# Patient Record
Sex: FEMALE
[De-identification: names, ages, dates, MRNs, and addresses within clinical notes are randomized per-mention and may not be internally consistent; named-entity substitution may affect disease eponyms.]

---

## 2017-01-25 ENCOUNTER — APPOINTMENT (OUTPATIENT)
Dept: ORTHOPEDIC SURGERY | Facility: CLINIC | Age: 82
End: 2017-01-25

## 2017-01-25 VITALS
HEART RATE: 58 BPM | BODY MASS INDEX: 28.12 KG/M2 | DIASTOLIC BLOOD PRESSURE: 82 MMHG | SYSTOLIC BLOOD PRESSURE: 156 MMHG | HEIGHT: 66 IN | WEIGHT: 175 LBS

## 2017-01-25 DIAGNOSIS — M19.90 UNSPECIFIED OSTEOARTHRITIS, UNSPECIFIED SITE: ICD-10-CM

## 2017-01-25 DIAGNOSIS — M75.40 IMPINGEMENT SYNDROME OF UNSPECIFIED SHOULDER: ICD-10-CM

## 2017-01-26 RX ORDER — HYALURONATE SOD, CROSS-LINKED 30 MG/3 ML
30 SYRINGE (ML) INTRAARTICULAR
Refills: 0 | Status: COMPLETED | OUTPATIENT
Start: 2017-01-26

## 2017-01-26 RX ADMIN — Medication 0 MG/3ML: at 00:00

## 2017-01-28 PROBLEM — M19.90 ARTHRITIS OF KNEE, RIGHT: Status: ACTIVE | Noted: 2017-01-28

## 2017-01-28 PROBLEM — M75.40 IMPINGEMENT SYNDROME OF SHOULDER REGION, UNSPECIFIED LATERALITY: Status: ACTIVE | Noted: 2017-01-28

## 2023-02-21 ENCOUNTER — APPOINTMENT (OUTPATIENT)
Dept: CARDIOLOGY | Facility: CLINIC | Age: 88
End: 2023-02-21
Payer: MEDICARE

## 2023-02-21 PROCEDURE — 99204 OFFICE O/P NEW MOD 45 MIN: CPT

## 2023-02-24 NOTE — ASSESSMENT
[FreeTextEntry1] : Assessment:\par 1.  Asymptomatic carotid artery plaque\par \par Plan\par 1.  Continue OMT with aspirin and statin therapy\par 2.  Repeat carotid duplex in 6 months.  \par 3.  Even with discrepancy of the duplex and CTA, the CTA was read as moderate.  Will continue medical management for now\par 4.  she has excellent pedal pulses on exam, no role for LE arterial tesitng\par 5.  return in 6.5 months after carotid duplex\par \par d/w daughter as well

## 2023-02-24 NOTE — REASON FOR VISIT
[FreeTextEntry1] : 87F history of CAD, HLD, HTN,  recent fall, - went to hospital.  Had CTA which showed left ICA 50-70% stenosis.  Carotid duplex performed in office  in Feb 2023 here showed mild plaque without elevation in velocities.  \par \par She is on aspirin and statin therapy

## 2023-09-05 ENCOUNTER — APPOINTMENT (OUTPATIENT)
Dept: CARDIOLOGY | Facility: CLINIC | Age: 88
End: 2023-09-05
Payer: MEDICARE

## 2023-09-05 DIAGNOSIS — I73.9 PERIPHERAL VASCULAR DISEASE, UNSPECIFIED: ICD-10-CM

## 2023-09-05 PROCEDURE — 99214 OFFICE O/P EST MOD 30 MIN: CPT

## 2023-09-05 NOTE — REASON FOR VISIT
[FreeTextEntry1] : 9/5/2023 Doing well had carotid duplex August 2023 no sig stenosis.   Velocities were ok  no CVA amaurosis, or TIA   87F history of CAD, HLD, HTN,  recent fall, - went to hospital.  Had CTA which showed left ICA 50-70% stenosis.  Carotid duplex performed in office  in Feb 2023 here showed mild plaque without elevation in velocities.    She is on aspirin and statin therapy

## 2024-02-19 ENCOUNTER — NON-APPOINTMENT (OUTPATIENT)
Age: 89
End: 2024-02-19

## 2024-02-20 ENCOUNTER — APPOINTMENT (OUTPATIENT)
Dept: CARDIOLOGY | Facility: CLINIC | Age: 89
End: 2024-02-20
Payer: SELF-PAY

## 2024-02-20 DIAGNOSIS — I87.2 VENOUS INSUFFICIENCY (CHRONIC) (PERIPHERAL): ICD-10-CM

## 2024-02-20 PROCEDURE — 99214 OFFICE O/P EST MOD 30 MIN: CPT

## 2024-02-20 NOTE — REASON FOR VISIT
[FreeTextEntry1] : 2/20/2024 For the past 2 weeks having bilateral leg pains worse in the morning Not worse with walking She wants to make sure its not a DVT.  on exam she has scattered VV, strong pedal pulses.  Calf tenderness bilaterally   She does have hammer toes.   She is not wearing compression garments The pains are from the foot - mid shin   No fevers or chills   9/5/2023 Doing well had carotid duplex August 2023 no sig stenosis.   Velocities were ok  no CVA amaurosis, or TIA   87F history of CAD, HLD, HTN,  recent fall, - went to hospital.  Had CTA which showed left ICA 50-70% stenosis.  Carotid duplex performed in office  in Feb 2023 here showed mild plaque without elevation in velocities.    She is on aspirin and statin therapy

## 2024-02-20 NOTE — ASSESSMENT
[FreeTextEntry1] : Assessment: 1.  Asymptomatic carotid artery plaque 2. bilateral calf pains   Plan 1.  Here for urgent add on visit for her legs 2.  She has palp pedal pulses on exam 3.  Will get venous duplex to assure no DVT 4.  If duplex normal, then reassurance, compression, stretching, epsom salt bath and time.   5.  RTC in July after carotid duplex

## 2024-07-16 ENCOUNTER — APPOINTMENT (OUTPATIENT)
Dept: CARDIOLOGY | Facility: CLINIC | Age: 89
End: 2024-07-16
Payer: MEDICARE

## 2024-07-16 DIAGNOSIS — I87.2 VENOUS INSUFFICIENCY (CHRONIC) (PERIPHERAL): ICD-10-CM

## 2024-07-16 PROCEDURE — 99213 OFFICE O/P EST LOW 20 MIN: CPT

## 2024-08-31 NOTE — ASSESSMENT
[FreeTextEntry1] : Assessment: 1.  Asymptomatic carotid artery plaque  Plan 1.  Continue OMT with aspirin and statin therapy 2.  Repeat carotid duplex in 9 months.   3.   Will continue medical management for now  4.  Return in 10 months  
No

## 2025-05-20 ENCOUNTER — APPOINTMENT (OUTPATIENT)
Dept: CARDIOLOGY | Facility: CLINIC | Age: 89
End: 2025-05-20
Payer: MEDICARE

## 2025-05-20 DIAGNOSIS — I73.9 PERIPHERAL VASCULAR DISEASE, UNSPECIFIED: ICD-10-CM

## 2025-05-20 DIAGNOSIS — I87.2 VENOUS INSUFFICIENCY (CHRONIC) (PERIPHERAL): ICD-10-CM

## 2025-05-20 PROCEDURE — 99214 OFFICE O/P EST MOD 30 MIN: CPT

## 2025-05-20 PROCEDURE — G2211 COMPLEX E/M VISIT ADD ON: CPT
